# Patient Record
Sex: FEMALE | HISPANIC OR LATINO | URBAN - METROPOLITAN AREA
[De-identification: names, ages, dates, MRNs, and addresses within clinical notes are randomized per-mention and may not be internally consistent; named-entity substitution may affect disease eponyms.]

---

## 2022-08-03 ENCOUNTER — OFFICE (OUTPATIENT)
Dept: URBAN - METROPOLITAN AREA CLINIC 94 | Facility: CLINIC | Age: 62
Setting detail: OPHTHALMOLOGY
End: 2022-08-03
Payer: COMMERCIAL

## 2022-08-03 DIAGNOSIS — H35.30: ICD-10-CM

## 2022-08-03 DIAGNOSIS — H04.123: ICD-10-CM

## 2022-08-03 PROBLEM — H25.13 CATARACT SENILE NUCLEAR SCLEROSIS; BOTH EYES: Status: ACTIVE | Noted: 2022-08-03

## 2022-08-03 PROCEDURE — 92250 FUNDUS PHOTOGRAPHY W/I&R: CPT | Performed by: OPHTHALMOLOGY

## 2022-08-03 PROCEDURE — 92002 INTRM OPH EXAM NEW PATIENT: CPT | Performed by: OPHTHALMOLOGY

## 2022-08-03 ASSESSMENT — REFRACTION_CURRENTRX
OD_ADD: +1.75
OD_AXIS: 087
OS_SPHERE: +2.75
OS_OVR_VA: 20/
OD_OVR_VA: 20/
OS_CYLINDER: -0.50
OS_AXIS: 107
OS_ADD: +1.75
OD_VPRISM_DIRECTION: PROGS
OD_CYLINDER: -0.25
OS_VPRISM_DIRECTION: PROGS
OD_SPHERE: +2.50

## 2022-08-03 ASSESSMENT — REFRACTION_MANIFEST
OS_VA1: 20/20
OD_AXIS: 080
OD_SPHERE: +2.25
OD_ADD: +1.25
OS_AXIS: 090
OD_CYLINDER: -0.75
OS_SPHERE: +2.50
OS_CYLINDER: -0.75
OS_ADD: +1.00
OD_VA1: 20/20

## 2022-08-03 ASSESSMENT — SUPERFICIAL PUNCTATE KERATITIS (SPK)
OD_SPK: 1+
OS_SPK: 1+

## 2022-08-03 ASSESSMENT — AXIALLENGTH_DERIVED
OS_AL: 22.9516
OS_AL: 23.1844
OD_AL: 23.2788
OD_AL: 22.9516

## 2022-08-03 ASSESSMENT — REFRACTION_AUTOREFRACTION
OD_AXIS: 086
OS_AXIS: 095
OD_SPHERE: +3.00
OD_CYLINDER: -0.50
OS_SPHERE: +3.00
OS_CYLINDER: -0.50

## 2022-08-03 ASSESSMENT — KERATOMETRY
OS_K2POWER_DIOPTERS: 42.50
OD_K2POWER_DIOPTERS: 42.50
OD_AXISANGLE_DEGREES: 180
OS_K1POWER_DIOPTERS: 42.25
OS_AXISANGLE_DEGREES: 034
OD_K1POWER_DIOPTERS: 42.25

## 2022-08-03 ASSESSMENT — LID EXAM ASSESSMENTS
OD_BLEPHARITIS: RLL 1+
OS_BLEPHARITIS: LLL 1+
OS_TRICHIASIS: LLL T

## 2022-08-03 ASSESSMENT — CORNEAL PTERYGIUM: OD_PTERYGIUM: NASAL 1MM

## 2022-08-03 ASSESSMENT — SPHEQUIV_DERIVED
OS_SPHEQUIV: 2.75
OD_SPHEQUIV: 2.75
OD_SPHEQUIV: 1.875
OS_SPHEQUIV: 2.125

## 2022-08-03 ASSESSMENT — VISUAL ACUITY
OD_BCVA: 20/20-
OS_BCVA: 20/25+

## 2022-08-03 ASSESSMENT — TONOMETRY
OS_IOP_MMHG: 16
OD_IOP_MMHG: 18

## 2022-08-03 ASSESSMENT — CONFRONTATIONAL VISUAL FIELD TEST (CVF)
OD_FINDINGS: FULL
OS_FINDINGS: FULL

## 2023-02-20 ENCOUNTER — APPOINTMENT (OUTPATIENT)
Dept: GASTROENTEROLOGY | Facility: CLINIC | Age: 63
End: 2023-02-20
Payer: MEDICAID

## 2023-02-20 VITALS
OXYGEN SATURATION: 97 % | BODY MASS INDEX: 25.6 KG/M2 | TEMPERATURE: 97.6 F | WEIGHT: 127 LBS | SYSTOLIC BLOOD PRESSURE: 130 MMHG | DIASTOLIC BLOOD PRESSURE: 78 MMHG | HEART RATE: 64 BPM | RESPIRATION RATE: 16 BRPM | HEIGHT: 59 IN

## 2023-02-20 PROCEDURE — 99203 OFFICE O/P NEW LOW 30 MIN: CPT

## 2023-02-20 RX ORDER — PANTOPRAZOLE 40 MG/1
40 TABLET, DELAYED RELEASE ORAL TWICE DAILY
Qty: 120 | Refills: 1 | Status: ACTIVE | COMMUNITY
Start: 2023-02-20 | End: 1900-01-01

## 2023-02-20 NOTE — ASSESSMENT
[FreeTextEntry1] : 62-year-old lady with hypertension, hyperlipidemia, surgical history significant for gallbladder resection and a  here for GERD symptoms.  The patient reports that she had an EGD more than 20 years ago which to her recollection was normal.  She has been having symptoms for a few months she does not take any NSAIDs she does not see any blood in the stool I have no recent blood work.  She has been gaining weight more recently.  She has not tried pantoprazole intermittently but is afraid to take it more regularly.  She does not smoke or drink alcohol.  There is no family history of any GI cancer.

## 2023-02-20 NOTE — HISTORY OF PRESENT ILLNESS
[FreeTextEntry1] : 62-year-old lady h/o HTN, HL, s/p lap nela, C section, tubal ligation, here for GERD sx. \par \par 1-2 mo ago\par occ odynophagia\par no spf food trigger\par can happen with water as well\par no prior\par ? EGD many years ago - ?20 y ago\par [place] the [place]\par \par increased weight\par no blood in the stool, no dark stools\par \par colon 5 y ago - was told she's due\par no FHx colon cancer\par + tics last time\par Dr Ibrahim [id number]\par avoids NSAIDs Tylenol for HA only\par \par PPI rx \par

## 2023-03-16 ENCOUNTER — EMERGENCY (EMERGENCY)
Facility: HOSPITAL | Age: 63
LOS: 1 days | Discharge: HOME | End: 2023-03-16
Attending: EMERGENCY MEDICINE
Payer: COMMERCIAL

## 2023-03-16 VITALS
OXYGEN SATURATION: 98 % | WEIGHT: 122.14 LBS | DIASTOLIC BLOOD PRESSURE: 62 MMHG | TEMPERATURE: 98 F | SYSTOLIC BLOOD PRESSURE: 125 MMHG | HEART RATE: 63 BPM | RESPIRATION RATE: 16 BRPM

## 2023-03-16 LAB
ALBUMIN SERPL ELPH-MCNC: 4.3 G/DL — SIGNIFICANT CHANGE UP (ref 3.3–5.2)
ALP SERPL-CCNC: 111 U/L — SIGNIFICANT CHANGE UP (ref 40–120)
ALT FLD-CCNC: 186 U/L — HIGH
ANION GAP SERPL CALC-SCNC: 14 MMOL/L — SIGNIFICANT CHANGE UP (ref 5–17)
AST SERPL-CCNC: 129 U/L — HIGH
BASOPHILS # BLD AUTO: 0.03 K/UL — SIGNIFICANT CHANGE UP (ref 0–0.2)
BASOPHILS NFR BLD AUTO: 0.4 % — SIGNIFICANT CHANGE UP (ref 0–2)
BILIRUB SERPL-MCNC: 0.5 MG/DL — SIGNIFICANT CHANGE UP (ref 0.4–2)
BUN SERPL-MCNC: 22.8 MG/DL — HIGH (ref 8–20)
CALCIUM SERPL-MCNC: 8.7 MG/DL — SIGNIFICANT CHANGE UP (ref 8.4–10.5)
CHLORIDE SERPL-SCNC: 105 MMOL/L — SIGNIFICANT CHANGE UP (ref 96–108)
CO2 SERPL-SCNC: 18 MMOL/L — LOW (ref 22–29)
CREAT SERPL-MCNC: 0.82 MG/DL — SIGNIFICANT CHANGE UP (ref 0.5–1.3)
EGFR: 80 ML/MIN/1.73M2 — SIGNIFICANT CHANGE UP
EOSINOPHIL # BLD AUTO: 0.06 K/UL — SIGNIFICANT CHANGE UP (ref 0–0.5)
EOSINOPHIL NFR BLD AUTO: 0.8 % — SIGNIFICANT CHANGE UP (ref 0–6)
GLUCOSE SERPL-MCNC: 95 MG/DL — SIGNIFICANT CHANGE UP (ref 70–99)
HCT VFR BLD CALC: 39.7 % — SIGNIFICANT CHANGE UP (ref 34.5–45)
HGB BLD-MCNC: 13.2 G/DL — SIGNIFICANT CHANGE UP (ref 11.5–15.5)
IMM GRANULOCYTES NFR BLD AUTO: 0.3 % — SIGNIFICANT CHANGE UP (ref 0–0.9)
LYMPHOCYTES # BLD AUTO: 2.1 K/UL — SIGNIFICANT CHANGE UP (ref 1–3.3)
LYMPHOCYTES # BLD AUTO: 29 % — SIGNIFICANT CHANGE UP (ref 13–44)
MAGNESIUM SERPL-MCNC: 2.3 MG/DL — SIGNIFICANT CHANGE UP (ref 1.8–2.6)
MCHC RBC-ENTMCNC: 29.9 PG — SIGNIFICANT CHANGE UP (ref 27–34)
MCHC RBC-ENTMCNC: 33.2 GM/DL — SIGNIFICANT CHANGE UP (ref 32–36)
MCV RBC AUTO: 89.8 FL — SIGNIFICANT CHANGE UP (ref 80–100)
MONOCYTES # BLD AUTO: 0.47 K/UL — SIGNIFICANT CHANGE UP (ref 0–0.9)
MONOCYTES NFR BLD AUTO: 6.5 % — SIGNIFICANT CHANGE UP (ref 2–14)
NEUTROPHILS # BLD AUTO: 4.57 K/UL — SIGNIFICANT CHANGE UP (ref 1.8–7.4)
NEUTROPHILS NFR BLD AUTO: 63 % — SIGNIFICANT CHANGE UP (ref 43–77)
PLATELET # BLD AUTO: 235 K/UL — SIGNIFICANT CHANGE UP (ref 150–400)
POTASSIUM SERPL-MCNC: 4.2 MMOL/L — SIGNIFICANT CHANGE UP (ref 3.5–5.3)
POTASSIUM SERPL-SCNC: 4.2 MMOL/L — SIGNIFICANT CHANGE UP (ref 3.5–5.3)
PROT SERPL-MCNC: 7.2 G/DL — SIGNIFICANT CHANGE UP (ref 6.6–8.7)
RBC # BLD: 4.42 M/UL — SIGNIFICANT CHANGE UP (ref 3.8–5.2)
RBC # FLD: 13.2 % — SIGNIFICANT CHANGE UP (ref 10.3–14.5)
SODIUM SERPL-SCNC: 137 MMOL/L — SIGNIFICANT CHANGE UP (ref 135–145)
WBC # BLD: 7.25 K/UL — SIGNIFICANT CHANGE UP (ref 3.8–10.5)
WBC # FLD AUTO: 7.25 K/UL — SIGNIFICANT CHANGE UP (ref 3.8–10.5)

## 2023-03-16 PROCEDURE — 36415 COLL VENOUS BLD VENIPUNCTURE: CPT

## 2023-03-16 PROCEDURE — 96375 TX/PRO/DX INJ NEW DRUG ADDON: CPT

## 2023-03-16 PROCEDURE — 80053 COMPREHEN METABOLIC PANEL: CPT

## 2023-03-16 PROCEDURE — 96361 HYDRATE IV INFUSION ADD-ON: CPT

## 2023-03-16 PROCEDURE — 99284 EMERGENCY DEPT VISIT MOD MDM: CPT | Mod: 25

## 2023-03-16 PROCEDURE — 99284 EMERGENCY DEPT VISIT MOD MDM: CPT

## 2023-03-16 PROCEDURE — 85025 COMPLETE CBC W/AUTO DIFF WBC: CPT

## 2023-03-16 PROCEDURE — 96374 THER/PROPH/DIAG INJ IV PUSH: CPT

## 2023-03-16 PROCEDURE — 83735 ASSAY OF MAGNESIUM: CPT

## 2023-03-16 PROCEDURE — T1013: CPT

## 2023-03-16 RX ORDER — SODIUM CHLORIDE 9 MG/ML
1000 INJECTION INTRAMUSCULAR; INTRAVENOUS; SUBCUTANEOUS ONCE
Refills: 0 | Status: COMPLETED | OUTPATIENT
Start: 2023-03-16 | End: 2023-03-16

## 2023-03-16 RX ORDER — ONDANSETRON 8 MG/1
4 TABLET, FILM COATED ORAL ONCE
Refills: 0 | Status: COMPLETED | OUTPATIENT
Start: 2023-03-16 | End: 2023-03-16

## 2023-03-16 RX ORDER — FAMOTIDINE 10 MG/ML
20 INJECTION INTRAVENOUS ONCE
Refills: 0 | Status: COMPLETED | OUTPATIENT
Start: 2023-03-16 | End: 2023-03-16

## 2023-03-16 RX ADMIN — SODIUM CHLORIDE 1000 MILLILITER(S): 9 INJECTION INTRAMUSCULAR; INTRAVENOUS; SUBCUTANEOUS at 16:20

## 2023-03-16 RX ADMIN — Medication 30 MILLILITER(S): at 16:19

## 2023-03-16 RX ADMIN — FAMOTIDINE 20 MILLIGRAM(S): 10 INJECTION INTRAVENOUS at 16:20

## 2023-03-16 RX ADMIN — ONDANSETRON 4 MILLIGRAM(S): 8 TABLET, FILM COATED ORAL at 16:20

## 2023-03-16 NOTE — ED STATDOCS - PATIENT PORTAL LINK FT
You can access the FollowMyHealth Patient Portal offered by [facility] by registering at the following website: [email or web address]. By joining [facility]’s FollowMyHealth portal, you will also be able to view your health information using other applications (apps) compatible with our system.

## 2023-03-16 NOTE — ED STATDOCS - NS ED ATTENDING STATEMENT MOD
This was a shared visit with the MILI. I reviewed and verified the documentation and independently performed the documented: 2019 19:24

## 2023-03-16 NOTE — ED STATDOCS - CLINICAL SUMMARY MEDICAL DECISION MAKING FREE TEXT BOX
61 y/o female with 3-4 days of N/V/D, benign abdominal exam, vital grossly WNL with + sick contacts, other well appearing, suspect viral gastroenteritis, will treat symptoms, check labs, reassess.

## 2023-03-16 NOTE — ED STATDOCS - OBJECTIVE STATEMENT
63 y/o female presents to the ED with 3-4 days of N/V/D, reports 10 episode diarrhea daily, granddaughter with similar on friday, no fevers, no other significant complaints.

## 2023-03-16 NOTE — ED STATDOCS - NS ED ROS FT
ROS: No fever/chills. No eye pain/changes in vision, No ear pain/sore throat/dysphagia, No chest pain/palpitations. No SOB/cough/. no black/bloody bm. No dysuria/frequency/discharge, No headache. No Dizziness.    No rashes or breaks in skin. No numbness/tingling/weakness.

## 2023-03-16 NOTE — ED STATDOCS - ATTENDING APP SHARED VISIT CONTRIBUTION OF CARE
Antwon: I performed a face to face bedside interview with patient regarding history of present illness, review of symptoms and past medical history. I completed an independent physical exam and ordered tests/medications as needed.  I have discussed patient's plan of care with advanced care provider. The advanced care provider assisted in  executing the discussed plan. I was available for any questions or issues that may have arose during the execution of the plan of care.

## 2023-03-16 NOTE — ED STATDOCS - PHYSICAL EXAMINATION
Head: atraumatic, normocephalic  Face: atraumatic, no crepitus no orbital/maxillary/mandibular ttp,   throat: uvula midline no exudates, slightly dry mucous membranes   eyes: perrla eomi  heart: rrr s1s2  lungs: ctab  abd: soft, nt nd +bs no rebound/guarding no cva ttp  skin: warm  LE: no swelling, no calf ttp  back: no midline cervical/thoracic/lumbar ttp

## 2023-03-16 NOTE — ED STATDOCS - PROGRESS NOTE DETAILS
Labs + elevated transaminase and pt was made aware of her Lab findings. Pt D/C in stable condition and will continue with BRAT Diet . Pt moved form intake Room. Pt seen and evaluated by intake Physician. HPI, Physical examination performed by intake Physician . Note reviewed and followup examination performed by me consistent with initial assessment. Agrees with intake Physician plan and tests.

## 2023-03-21 ENCOUNTER — APPOINTMENT (OUTPATIENT)
Dept: GASTROENTEROLOGY | Facility: CLINIC | Age: 63
End: 2023-03-21

## 2023-05-31 ENCOUNTER — APPOINTMENT (OUTPATIENT)
Dept: GASTROENTEROLOGY | Facility: CLINIC | Age: 63
End: 2023-05-31
Payer: MEDICAID

## 2023-05-31 VITALS
TEMPERATURE: 97.9 F | DIASTOLIC BLOOD PRESSURE: 78 MMHG | OXYGEN SATURATION: 97 % | SYSTOLIC BLOOD PRESSURE: 110 MMHG | RESPIRATION RATE: 14 BRPM | BODY MASS INDEX: 26.21 KG/M2 | WEIGHT: 130 LBS | HEART RATE: 64 BPM | HEIGHT: 59 IN

## 2023-05-31 DIAGNOSIS — Z00.00 ENCOUNTER FOR GENERAL ADULT MEDICAL EXAMINATION W/OUT ABNORMAL FINDINGS: ICD-10-CM

## 2023-05-31 PROCEDURE — 99214 OFFICE O/P EST MOD 30 MIN: CPT

## 2023-05-31 RX ORDER — SODIUM SULFATE, MAGNESIUM SULFATE, AND POTASSIUM CHLORIDE 17.75; 2.7; 2.25 G/1; G/1; G/1
1479-225-188 TABLET ORAL
Qty: 24 | Refills: 0 | Status: ACTIVE | COMMUNITY
Start: 2023-05-31 | End: 1900-01-01

## 2023-05-31 NOTE — HISTORY OF PRESENT ILLNESS
[FreeTextEntry1] : 62-year-old lady here in follow-up.  Past medical history significant for GERD only\par Last saw me in February 2023 plan was as below:\par \par 1. GERD diet; we discussed avoiding spicy food, carbonated beverages, caffeinated beverages, citric products, tomatoes.\par 2. PPI twice daily trial for a month\par 3. Return here in a month; if no substantive improvement she will need an endoscopy\par 4. Patient had a colonoscopy approximately 5 years ago and her primary care doctor thinks she is due again. To the best of her recollection she was told she had diverticuli and she does not recall the gastroenterologist giving her a follow-up interval. I asked her to sign a release of records form so we can request this colonoscopy and she might have a copy at home which she will try to send to me as well. \par \par Patient is feeling better but not 100% with the acid blocker.  Regarding the colonoscopy, she and our office have been unable to locate previous colonoscopy reports.

## 2023-05-31 NOTE — ASSESSMENT
[FreeTextEntry1] : 62-year-old lady here in follow-up.  Past medical history significant for GERD only.  I am not clear that she is up-to-date with colon cancer screening -the previous report could not be obtained by either the patient or the office from her last gastroenterologist

## 2023-08-06 ENCOUNTER — TRANSCRIPTION ENCOUNTER (OUTPATIENT)
Age: 63
End: 2023-08-06

## 2023-08-07 ENCOUNTER — APPOINTMENT (OUTPATIENT)
Dept: GASTROENTEROLOGY | Facility: GI CENTER | Age: 63
End: 2023-08-07
Payer: MEDICAID

## 2023-08-07 ENCOUNTER — OUTPATIENT (OUTPATIENT)
Dept: OUTPATIENT SERVICES | Facility: HOSPITAL | Age: 63
LOS: 1 days | End: 2023-08-07
Payer: COMMERCIAL

## 2023-08-07 ENCOUNTER — RESULT REVIEW (OUTPATIENT)
Age: 63
End: 2023-08-07

## 2023-08-07 DIAGNOSIS — K21.9 GASTRO-ESOPHAGEAL REFLUX DISEASE W/OUT ESOPHAGITIS: ICD-10-CM

## 2023-08-07 DIAGNOSIS — K21.9 GASTRO-ESOPHAGEAL REFLUX DISEASE WITHOUT ESOPHAGITIS: ICD-10-CM

## 2023-08-07 DIAGNOSIS — Z12.11 ENCOUNTER FOR SCREENING FOR MALIGNANT NEOPLASM OF COLON: ICD-10-CM

## 2023-08-07 PROCEDURE — 88342 IMHCHEM/IMCYTCHM 1ST ANTB: CPT | Mod: 26

## 2023-08-07 PROCEDURE — 45380 COLONOSCOPY AND BIOPSY: CPT | Mod: 33

## 2023-08-07 PROCEDURE — 43239 EGD BIOPSY SINGLE/MULTIPLE: CPT

## 2023-08-07 PROCEDURE — 88305 TISSUE EXAM BY PATHOLOGIST: CPT | Mod: 26

## 2023-08-07 PROCEDURE — 45380 COLONOSCOPY AND BIOPSY: CPT | Mod: PT

## 2023-08-07 PROCEDURE — 43239 EGD BIOPSY SINGLE/MULTIPLE: CPT | Mod: 59

## 2023-08-07 PROCEDURE — 88305 TISSUE EXAM BY PATHOLOGIST: CPT

## 2023-08-07 PROCEDURE — 88342 IMHCHEM/IMCYTCHM 1ST ANTB: CPT

## 2023-08-07 NOTE — ASSESSMENT
[FreeTextEntry1] : 63 F GERD here for colon cancer screening.   The procedure(s) was (were) discussed in detail with the patient, including indications, alternatives and limitations.  The risks and benefits were reviewed.  If applicable, the prep directions were reviewed as well, else the patient was told to fast on the day of the procedure.

## 2023-08-07 NOTE — HISTORY OF PRESENT ILLNESS
[FreeTextEntry1] : 63 F GERD here for colon cancer screening.  Last colon 5 y ago tics rest of details lacking

## 2023-08-16 ENCOUNTER — NON-APPOINTMENT (OUTPATIENT)
Age: 63
End: 2023-08-16

## 2023-08-16 LAB
SURGICAL PATHOLOGY STUDY: SIGNIFICANT CHANGE UP

## 2023-09-22 ENCOUNTER — OFFICE (OUTPATIENT)
Dept: URBAN - METROPOLITAN AREA CLINIC 94 | Facility: CLINIC | Age: 63
Setting detail: OPHTHALMOLOGY
End: 2023-09-22
Payer: COMMERCIAL

## 2023-09-22 DIAGNOSIS — H04.123: ICD-10-CM

## 2023-09-22 DIAGNOSIS — H35.30: ICD-10-CM

## 2023-09-22 DIAGNOSIS — H25.13: ICD-10-CM

## 2023-09-22 PROBLEM — H25.043 POSTERIOR SUBCAPSULAR CATARACT 366.14; BOTH EYES: Status: ACTIVE | Noted: 2023-09-22

## 2023-09-22 PROBLEM — H16.223 DRY EYE SYNDROME K SICCA; BOTH EYES: Status: ACTIVE | Noted: 2023-09-22

## 2023-09-22 PROCEDURE — 92250 FUNDUS PHOTOGRAPHY W/I&R: CPT | Performed by: OPHTHALMOLOGY

## 2023-09-22 PROCEDURE — 92014 COMPRE OPH EXAM EST PT 1/>: CPT | Performed by: OPHTHALMOLOGY

## 2023-09-22 ASSESSMENT — REFRACTION_MANIFEST
OS_ADD: +1.00
OD_AXIS: 080
OS_CYLINDER: -0.75
OD_SPHERE: +2.25
OS_AXIS: 090
OD_CYLINDER: -0.75
OS_SPHERE: +2.50
OD_VA1: 20/20
OS_VA1: 20/20
OD_ADD: +1.25

## 2023-09-22 ASSESSMENT — REFRACTION_CURRENTRX
OS_OVR_VA: 20/
OD_OVR_VA: 20/
OS_CYLINDER: -0.50
OS_AXIS: 110
OS_AXIS: 107
OD_OVR_VA: 20/
OD_VPRISM_DIRECTION: PROGS
OS_SPHERE: +2.75
OS_CYLINDER: -0.50
OS_SPHERE: +2.75
OS_ADD: +2.00
OS_ADD: +1.75
OS_OVR_VA: 20/
OD_ADD: +2.00
OD_ADD: +1.75
OD_CYLINDER: -0.25
OS_VPRISM_DIRECTION: PROGS
OS_VPRISM_DIRECTION: PROGS
OD_AXIS: 087
OD_VPRISM_DIRECTION: PROGS
OD_CYLINDER: -0.25
OD_AXIS: 037
OD_SPHERE: +2.50
OD_SPHERE: +2.75

## 2023-09-22 ASSESSMENT — LID EXAM ASSESSMENTS
OD_BLEPHARITIS: RLL 1+
OS_TRICHIASIS: LLL T
OS_BLEPHARITIS: LLL 1+

## 2023-09-22 ASSESSMENT — CONFRONTATIONAL VISUAL FIELD TEST (CVF)
OS_FINDINGS: FULL
OD_FINDINGS: FULL

## 2023-09-22 ASSESSMENT — VISUAL ACUITY
OD_BCVA: 20/30
OS_BCVA: 20/25-1

## 2023-09-22 ASSESSMENT — SPHEQUIV_DERIVED
OS_SPHEQUIV: 2.125
OD_SPHEQUIV: 2.875
OS_SPHEQUIV: 2.875
OD_SPHEQUIV: 1.875

## 2023-09-22 ASSESSMENT — REFRACTION_AUTOREFRACTION
OD_AXIS: 092
OS_CYLINDER: -0.75
OD_SPHERE: +3.25
OD_CYLINDER: -0.75
OS_SPHERE: +3.25
OS_AXIS: 089

## 2023-09-22 ASSESSMENT — KERATOMETRY
OD_AXISANGLE_DEGREES: 180
OD_K1POWER_DIOPTERS: 42.25
METHOD_AUTO_MANUAL: AUTO
OS_K2POWER_DIOPTERS: 42.50
OS_AXISANGLE_DEGREES: 034
OD_K2POWER_DIOPTERS: 42.50
OS_K1POWER_DIOPTERS: 42.25

## 2023-09-22 ASSESSMENT — TONOMETRY
OS_IOP_MMHG: 18
OD_IOP_MMHG: 14

## 2023-09-22 ASSESSMENT — AXIALLENGTH_DERIVED
OD_AL: 23.2788
OS_AL: 22.9056
OS_AL: 23.1844
OD_AL: 22.9056

## 2023-09-22 ASSESSMENT — SUPERFICIAL PUNCTATE KERATITIS (SPK)
OS_SPK: 1+
OD_SPK: 1+

## 2023-09-22 ASSESSMENT — CORNEAL PTERYGIUM: OD_PTERYGIUM: NASAL 1MM

## 2023-12-15 ENCOUNTER — OFFICE (OUTPATIENT)
Dept: URBAN - METROPOLITAN AREA CLINIC 94 | Facility: CLINIC | Age: 63
Setting detail: OPHTHALMOLOGY
End: 2023-12-15
Payer: COMMERCIAL

## 2023-12-15 DIAGNOSIS — H35.033: ICD-10-CM

## 2023-12-15 DIAGNOSIS — H25.043: ICD-10-CM

## 2023-12-15 DIAGNOSIS — H25.11: ICD-10-CM

## 2023-12-15 DIAGNOSIS — H25.13: ICD-10-CM

## 2023-12-15 DIAGNOSIS — H04.123: ICD-10-CM

## 2023-12-15 PROBLEM — H25.12 CATARACT SENILE NUCLEAR SCLEROSIS; RIGHT EYE, LEFT EYE, BOTH EYES: Status: ACTIVE | Noted: 2023-12-15

## 2023-12-15 PROCEDURE — 92250 FUNDUS PHOTOGRAPHY W/I&R: CPT | Performed by: OPHTHALMOLOGY

## 2023-12-15 PROCEDURE — 92136 OPHTHALMIC BIOMETRY: CPT | Mod: 26,RT | Performed by: OPHTHALMOLOGY

## 2023-12-15 PROCEDURE — 92136 OPHTHALMIC BIOMETRY: CPT | Mod: TC | Performed by: OPHTHALMOLOGY

## 2023-12-15 PROCEDURE — 99214 OFFICE O/P EST MOD 30 MIN: CPT | Performed by: OPHTHALMOLOGY

## 2023-12-15 ASSESSMENT — REFRACTION_MANIFEST
OD_ADD: +1.25
OD_CYLINDER: -0.75
OD_SPHERE: +3.00
OS_ADD: +1.00
OD_AXIS: 086
OS_VA1: 20/20
OS_SPHERE: +3.25
OS_AXIS: 090
OS_SPHERE: +2.50
OD_SPHERE: +2.25
OS_CYLINDER: -0.75
OS_VA1: 20/20
OD_CYLINDER: -1.00
OD_AXIS: 080
OS_CYLINDER: -0.75
OS_AXIS: 094
OD_VA1: 20/20
OD_VA1: 20/25

## 2023-12-15 ASSESSMENT — CORNEAL PTERYGIUM: OD_PTERYGIUM: NASAL 1MM

## 2023-12-15 ASSESSMENT — LID EXAM ASSESSMENTS
OS_BLEPHARITIS: LLL 1+
OD_BLEPHARITIS: RLL 1+
OS_TRICHIASIS: LLL T

## 2023-12-15 ASSESSMENT — REFRACTION_AUTOREFRACTION
OD_AXIS: 086
OD_CYLINDER: -1.00
OS_CYLINDER: -0.75
OD_SPHERE: +3.00
OS_SPHERE: +3.25
OS_AXIS: 094

## 2023-12-15 ASSESSMENT — REFRACTION_CURRENTRX
OD_ADD: +2.00
OS_CYLINDER: -0.50
OD_ADD: +1.75
OD_AXIS: 037
OS_OVR_VA: 20/
OS_VPRISM_DIRECTION: PROGS
OS_AXIS: 107
OD_AXIS: 087
OD_VPRISM_DIRECTION: PROGS
OS_OVR_VA: 20/
OD_OVR_VA: 20/
OD_VPRISM_DIRECTION: PROGS
OD_OVR_VA: 20/
OD_CYLINDER: -0.25
OS_SPHERE: +2.75
OS_CYLINDER: -0.50
OD_CYLINDER: -0.25
OS_VPRISM_DIRECTION: PROGS
OS_SPHERE: +2.75
OS_ADD: +1.75
OS_ADD: +2.00
OD_SPHERE: +2.50
OD_SPHERE: +2.75
OS_AXIS: 110

## 2023-12-15 ASSESSMENT — SUPERFICIAL PUNCTATE KERATITIS (SPK)
OD_SPK: 1+
OS_SPK: 1+

## 2023-12-15 ASSESSMENT — SPHEQUIV_DERIVED
OS_SPHEQUIV: 2.875
OD_SPHEQUIV: 2.5
OD_SPHEQUIV: 2.5
OD_SPHEQUIV: 1.875
OS_SPHEQUIV: 2.875
OS_SPHEQUIV: 2.125

## 2023-12-15 ASSESSMENT — CONFRONTATIONAL VISUAL FIELD TEST (CVF)
OS_FINDINGS: FULL
OD_FINDINGS: FULL

## 2024-03-01 ENCOUNTER — ASC (OUTPATIENT)
Dept: URBAN - METROPOLITAN AREA SURGERY 8 | Facility: SURGERY | Age: 64
Setting detail: OPHTHALMOLOGY
End: 2024-03-01
Payer: COMMERCIAL

## 2024-03-01 DIAGNOSIS — H25.11: ICD-10-CM

## 2024-03-01 DIAGNOSIS — H52.211: ICD-10-CM

## 2024-03-01 PROCEDURE — FEMTO FEMTOSECOND LASER: Mod: GY | Performed by: OPHTHALMOLOGY

## 2024-03-01 PROCEDURE — 66984 XCAPSL CTRC RMVL W/O ECP: CPT | Mod: RT | Performed by: OPHTHALMOLOGY

## 2024-03-02 ENCOUNTER — OFFICE (OUTPATIENT)
Dept: URBAN - METROPOLITAN AREA CLINIC 94 | Facility: CLINIC | Age: 64
Setting detail: OPHTHALMOLOGY
End: 2024-03-02
Payer: COMMERCIAL

## 2024-03-02 DIAGNOSIS — Z96.1: ICD-10-CM

## 2024-03-02 PROCEDURE — 99024 POSTOP FOLLOW-UP VISIT: CPT | Performed by: REGISTERED NURSE

## 2024-03-02 ASSESSMENT — REFRACTION_CURRENTRX
OD_SPHERE: +2.75
OS_CYLINDER: -0.50
OD_VPRISM_DIRECTION: PROGS
OD_ADD: +1.75
OS_ADD: +2.00
OD_VPRISM_DIRECTION: PROGS
OS_AXIS: 107
OD_AXIS: 037
OD_CYLINDER: -0.25
OS_VPRISM_DIRECTION: PROGS
OS_SPHERE: +2.75
OD_AXIS: 087
OS_SPHERE: +2.75
OD_ADD: +2.00
OD_SPHERE: +2.50
OS_AXIS: 110
OS_ADD: +1.75
OS_OVR_VA: 20/
OS_CYLINDER: -0.50
OD_OVR_VA: 20/
OD_OVR_VA: 20/
OS_OVR_VA: 20/
OS_VPRISM_DIRECTION: PROGS
OD_CYLINDER: -0.25

## 2024-03-02 ASSESSMENT — REFRACTION_MANIFEST
OS_CYLINDER: -0.75
OS_CYLINDER: -0.75
OD_SPHERE: +3.00
OD_CYLINDER: -0.75
OS_AXIS: 090
OS_VA1: 20/20
OS_ADD: +1.00
OD_VA1: 20/25
OS_SPHERE: +3.25
OD_AXIS: 080
OD_CYLINDER: -1.00
OD_SPHERE: +2.25
OS_SPHERE: +2.50
OD_VA1: 20/20
OD_ADD: +1.25
OD_AXIS: 086
OS_AXIS: 094
OS_VA1: 20/20

## 2024-03-02 ASSESSMENT — LID EXAM ASSESSMENTS
OD_BLEPHARITIS: RLL 1+
OS_TRICHIASIS: LLL T
OS_BLEPHARITIS: LLL 1+

## 2024-03-02 ASSESSMENT — SPHEQUIV_DERIVED
OD_SPHEQUIV: 1.875
OS_SPHEQUIV: 2.875
OD_SPHEQUIV: 2.5
OS_SPHEQUIV: 2.125

## 2024-03-08 ENCOUNTER — RX ONLY (RX ONLY)
Age: 64
End: 2024-03-08

## 2024-03-08 ENCOUNTER — OFFICE (OUTPATIENT)
Dept: URBAN - METROPOLITAN AREA CLINIC 94 | Facility: CLINIC | Age: 64
Setting detail: OPHTHALMOLOGY
End: 2024-03-08
Payer: COMMERCIAL

## 2024-03-08 DIAGNOSIS — H43.391: ICD-10-CM

## 2024-03-08 DIAGNOSIS — H25.12: ICD-10-CM

## 2024-03-08 DIAGNOSIS — Z96.1: ICD-10-CM

## 2024-03-08 PROCEDURE — 99024 POSTOP FOLLOW-UP VISIT: CPT | Performed by: PHYSICIAN ASSISTANT

## 2024-03-08 ASSESSMENT — LID EXAM ASSESSMENTS
OD_BLEPHARITIS: RLL 1+
OS_TRICHIASIS: LLL T
OS_BLEPHARITIS: LLL 1+

## 2024-03-08 ASSESSMENT — REFRACTION_CURRENTRX
OS_VPRISM_DIRECTION: PROGS
OS_OVR_VA: 20/
OS_CYLINDER: -0.50
OD_AXIS: 037
OD_OVR_VA: 20/
OD_SPHERE: +2.50
OD_VPRISM_DIRECTION: PROGS
OD_CYLINDER: -0.25
OD_CYLINDER: -0.25
OS_AXIS: 110
OS_SPHERE: +2.75
OD_ADD: +1.75
OS_AXIS: 107
OS_SPHERE: +2.75
OD_OVR_VA: 20/
OS_VPRISM_DIRECTION: PROGS
OD_ADD: +2.00
OS_ADD: +1.75
OD_AXIS: 087
OS_CYLINDER: -0.50
OD_SPHERE: +2.75
OD_VPRISM_DIRECTION: PROGS
OS_ADD: +2.00
OS_OVR_VA: 20/

## 2024-03-08 ASSESSMENT — REFRACTION_MANIFEST
OD_AXIS: 086
OS_SPHERE: +3.25
OD_VA1: 20/25
OS_VA1: 20/20
OS_CYLINDER: -0.75
OD_SPHERE: +2.25
OS_CYLINDER: -0.75
OS_VA1: 20/20
OS_SPHERE: +2.50
OD_CYLINDER: -0.75
OD_VA1: 20/20
OD_CYLINDER: -1.00
OD_SPHERE: +3.00
OS_AXIS: 094
OD_AXIS: 080
OS_AXIS: 090
OS_ADD: +1.00
OD_ADD: +1.25

## 2024-03-08 ASSESSMENT — SPHEQUIV_DERIVED
OD_SPHEQUIV: 1.875
OS_SPHEQUIV: 2.875
OD_SPHEQUIV: 2.5
OS_SPHEQUIV: 2.125

## 2024-03-15 ENCOUNTER — ASC (OUTPATIENT)
Dept: URBAN - METROPOLITAN AREA SURGERY 8 | Facility: SURGERY | Age: 64
Setting detail: OPHTHALMOLOGY
End: 2024-03-15
Payer: COMMERCIAL

## 2024-03-15 DIAGNOSIS — H25.12: ICD-10-CM

## 2024-03-15 DIAGNOSIS — H52.212: ICD-10-CM

## 2024-03-15 PROCEDURE — 66984 XCAPSL CTRC RMVL W/O ECP: CPT | Mod: 79,LT | Performed by: OPHTHALMOLOGY

## 2024-03-15 PROCEDURE — FEMTO FEMTOSECOND LASER: Mod: GY | Performed by: OPHTHALMOLOGY

## 2024-03-16 ENCOUNTER — OFFICE (OUTPATIENT)
Dept: URBAN - METROPOLITAN AREA CLINIC 94 | Facility: CLINIC | Age: 64
Setting detail: OPHTHALMOLOGY
End: 2024-03-16
Payer: COMMERCIAL

## 2024-03-16 ENCOUNTER — RX ONLY (RX ONLY)
Age: 64
End: 2024-03-16

## 2024-03-16 DIAGNOSIS — Z96.1: ICD-10-CM

## 2024-03-16 PROCEDURE — 99024 POSTOP FOLLOW-UP VISIT: CPT | Performed by: OPTOMETRIST

## 2024-03-16 ASSESSMENT — REFRACTION_CURRENTRX
OS_SPHERE: +2.75
OD_OVR_VA: 20/
OS_AXIS: 110
OS_VPRISM_DIRECTION: PROGS
OS_CYLINDER: -0.50
OD_VPRISM_DIRECTION: PROGS
OS_OVR_VA: 20/
OS_CYLINDER: -0.50
OD_AXIS: 087
OD_SPHERE: +2.50
OS_VPRISM_DIRECTION: PROGS
OD_CYLINDER: -0.25
OD_SPHERE: +2.75
OS_ADD: +2.00
OD_ADD: +1.75
OD_CYLINDER: -0.25
OS_ADD: +1.75
OD_ADD: +2.00
OD_OVR_VA: 20/
OS_SPHERE: +2.75
OD_AXIS: 037
OS_OVR_VA: 20/
OS_AXIS: 107
OD_VPRISM_DIRECTION: PROGS

## 2024-03-16 ASSESSMENT — REFRACTION_MANIFEST
OD_ADD: +1.25
OD_AXIS: 080
OD_CYLINDER: -1.00
OS_VA1: 20/20
OS_CYLINDER: -0.75
OS_SPHERE: +3.25
OS_SPHERE: +2.50
OS_CYLINDER: -0.75
OD_SPHERE: +2.25
OS_VA1: 20/20
OD_VA1: 20/20
OD_VA1: 20/25
OS_AXIS: 090
OS_AXIS: 094
OS_ADD: +1.00
OD_AXIS: 086
OD_CYLINDER: -0.75
OD_SPHERE: +3.00

## 2024-03-16 ASSESSMENT — LID EXAM ASSESSMENTS
OS_BLEPHARITIS: LLL 1+
OS_TRICHIASIS: LLL T
OD_BLEPHARITIS: RLL 1+

## 2024-03-16 ASSESSMENT — SPHEQUIV_DERIVED
OS_SPHEQUIV: 2.875
OS_SPHEQUIV: 2.125
OD_SPHEQUIV: 2.5
OD_SPHEQUIV: 1.875

## 2024-03-29 ENCOUNTER — OFFICE (OUTPATIENT)
Dept: URBAN - METROPOLITAN AREA CLINIC 94 | Facility: CLINIC | Age: 64
Setting detail: OPHTHALMOLOGY
End: 2024-03-29
Payer: COMMERCIAL

## 2024-03-29 DIAGNOSIS — Z96.1: ICD-10-CM

## 2024-03-29 DIAGNOSIS — H26.493: ICD-10-CM

## 2024-03-29 PROBLEM — H25.042 POSTERIOR SUBCAPSULAR CATARACT 366.14; LEFT EYE: Status: ACTIVE | Noted: 2024-03-02

## 2024-03-29 PROBLEM — H11.041 PTERYGIUM-PERIPHERAL; RIGHT EYE: Status: ACTIVE | Noted: 2024-03-08

## 2024-03-29 PROBLEM — H43.391 VITREOUS FLOATERS; RIGHT EYE: Status: ACTIVE | Noted: 2024-03-08

## 2024-03-29 PROCEDURE — 99024 POSTOP FOLLOW-UP VISIT: CPT | Performed by: PHYSICIAN ASSISTANT

## 2024-03-29 ASSESSMENT — SPHEQUIV_DERIVED
OD_SPHEQUIV: 1.875
OS_SPHEQUIV: 2.125
OD_SPHEQUIV: 2.5
OS_SPHEQUIV: 2.875

## 2024-03-29 ASSESSMENT — REFRACTION_CURRENTRX
OD_ADD: +1.75
OS_OVR_VA: 20/
OD_VPRISM_DIRECTION: PROGS
OD_CYLINDER: -0.25
OD_CYLINDER: -0.25
OD_SPHERE: +2.50
OS_SPHERE: +2.75
OS_SPHERE: +2.75
OD_OVR_VA: 20/
OD_SPHERE: +2.75
OS_OVR_VA: 20/
OS_VPRISM_DIRECTION: PROGS
OS_VPRISM_DIRECTION: PROGS
OD_AXIS: 037
OS_AXIS: 110
OS_ADD: +1.75
OS_ADD: +2.00
OD_ADD: +2.00
OS_AXIS: 107
OS_CYLINDER: -0.50
OS_CYLINDER: -0.50
OD_AXIS: 087
OD_VPRISM_DIRECTION: PROGS
OD_OVR_VA: 20/

## 2024-03-29 ASSESSMENT — REFRACTION_MANIFEST
OD_CYLINDER: -1.00
OD_SPHERE: +3.00
OD_SPHERE: +2.25
OS_ADD: +1.00
OD_VA1: 20/25
OD_ADD: +1.25
OD_AXIS: 086
OS_VA1: 20/20
OS_VA1: 20/20
OS_SPHERE: +3.25
OD_AXIS: 080
OS_AXIS: 094
OD_VA1: 20/20
OS_AXIS: 090
OS_CYLINDER: -0.75
OS_CYLINDER: -0.75
OS_SPHERE: +2.50
OD_CYLINDER: -0.75

## 2024-03-29 ASSESSMENT — LID EXAM ASSESSMENTS
OS_TRICHIASIS: LLL T
OD_BLEPHARITIS: RLL 1+
OS_BLEPHARITIS: LLL 1+

## 2024-04-18 ENCOUNTER — OFFICE (OUTPATIENT)
Dept: URBAN - METROPOLITAN AREA CLINIC 94 | Facility: CLINIC | Age: 64
Setting detail: OPHTHALMOLOGY
End: 2024-04-18
Payer: COMMERCIAL

## 2024-04-18 DIAGNOSIS — Z96.1: ICD-10-CM

## 2024-04-18 PROCEDURE — 99024 POSTOP FOLLOW-UP VISIT: CPT | Performed by: OPTOMETRIST

## 2024-04-18 ASSESSMENT — LID EXAM ASSESSMENTS
OS_BLEPHARITIS: LLL 1+
OS_TRICHIASIS: LLL T
OD_BLEPHARITIS: RLL 1+

## 2024-08-09 ENCOUNTER — OFFICE (OUTPATIENT)
Dept: URBAN - METROPOLITAN AREA CLINIC 94 | Facility: CLINIC | Age: 64
Setting detail: OPHTHALMOLOGY
End: 2024-08-09
Payer: COMMERCIAL

## 2024-08-09 DIAGNOSIS — H26.493: ICD-10-CM

## 2024-08-09 DIAGNOSIS — Z96.1: ICD-10-CM

## 2024-08-09 PROCEDURE — 99214 OFFICE O/P EST MOD 30 MIN: CPT | Performed by: OPHTHALMOLOGY

## 2024-08-09 ASSESSMENT — LID EXAM ASSESSMENTS
OS_BLEPHARITIS: LLL 1+
OS_TRICHIASIS: LLL T
OD_BLEPHARITIS: RLL 1+

## 2024-08-09 ASSESSMENT — CONFRONTATIONAL VISUAL FIELD TEST (CVF)
OS_FINDINGS: FULL
OD_FINDINGS: FULL

## 2024-08-22 ENCOUNTER — APPOINTMENT (OUTPATIENT)
Dept: DERMATOLOGY | Facility: CLINIC | Age: 64
End: 2024-08-22
Payer: MEDICARE

## 2024-08-22 PROCEDURE — 99203 OFFICE O/P NEW LOW 30 MIN: CPT

## 2024-08-28 ENCOUNTER — ASC (OUTPATIENT)
Dept: URBAN - METROPOLITAN AREA SURGERY 8 | Facility: SURGERY | Age: 64
Setting detail: OPHTHALMOLOGY
End: 2024-08-28
Payer: COMMERCIAL

## 2024-08-28 DIAGNOSIS — H26.491: ICD-10-CM

## 2024-08-28 PROCEDURE — 66821 AFTER CATARACT LASER SURGERY: CPT | Mod: RT | Performed by: OPHTHALMOLOGY

## 2024-08-30 PROBLEM — H26.492 POSTERIOR CAPSULE OPACITY; RIGHT EYE, LEFT EYE: Status: ACTIVE | Noted: 2024-08-28

## 2024-08-30 PROBLEM — H26.491 POSTERIOR CAPSULE OPACITY; RIGHT EYE, LEFT EYE: Status: ACTIVE | Noted: 2024-08-28

## 2024-10-04 ENCOUNTER — OFFICE (OUTPATIENT)
Dept: URBAN - METROPOLITAN AREA CLINIC 94 | Facility: CLINIC | Age: 64
Setting detail: OPHTHALMOLOGY
End: 2024-10-04
Payer: COMMERCIAL

## 2024-10-04 DIAGNOSIS — Z96.1: ICD-10-CM

## 2024-10-04 PROCEDURE — 99024 POSTOP FOLLOW-UP VISIT: CPT | Performed by: OPHTHALMOLOGY

## 2024-10-04 ASSESSMENT — REFRACTION_CURRENTRX
OD_SPHERE: +2.50
OD_AXIS: 087
OD_OVR_VA: 20/
OS_OVR_VA: 20/
OS_VPRISM_DIRECTION: PROGS
OD_CYLINDER: -0.25
OD_VPRISM_DIRECTION: PROGS
OS_ADD: +1.75
OD_ADD: +2.00
OD_CYLINDER: -0.25
OD_SPHERE: +2.75
OS_CYLINDER: -0.50
OD_AXIS: 037
OD_OVR_VA: 20/
OS_OVR_VA: 20/
OS_ADD: +2.00
OS_SPHERE: +2.75
OS_VPRISM_DIRECTION: PROGS
OS_AXIS: 110
OS_CYLINDER: -0.50
OD_ADD: +1.75
OD_VPRISM_DIRECTION: PROGS
OS_SPHERE: +2.75
OS_AXIS: 107

## 2024-10-04 ASSESSMENT — REFRACTION_MANIFEST
OD_CYLINDER: -0.75
OD_VA1: 20/20
OS_AXIS: 090
OS_VA1: 20/20
OD_SPHERE: +0.25
OD_VA1: 20/25
OD_AXIS: 095
OD_SPHERE: +2.25
OD_SPHERE: PLANO
OS_VA1: 20/20
OD_VA1: 20/25
OD_AXIS: 086
OS_AXIS: 086
OS_SPHERE: +2.50
OD_SPHERE: +3.00
OS_AXIS: 094
OD_CYLINDER: -0.75
OD_ADD: +1.50
OS_SPHERE: 0.00
OS_ADD: +1.00
OS_SPHERE: +3.25
OD_CYLINDER: -1.00
OS_SPHERE: PLANO
OS_VA1: 20/25
OS_CYLINDER: -0.75
OS_CYLINDER: -0.75
OD_VA1: 20/20
OD_AXIS: 080
OD_ADD: +1.25
OS_VA1: 20/20
OS_ADD: +1.50
OS_CYLINDER: -0.75

## 2024-10-04 ASSESSMENT — REFRACTION_AUTOREFRACTION
OS_SPHERE: +0.50
OD_CYLINDER: -1.25
OS_CYLINDER: -1.25
OD_AXIS: 097
OS_AXIS: 083
OD_SPHERE: +0.75

## 2024-10-04 ASSESSMENT — LID EXAM ASSESSMENTS
OS_TRICHIASIS: LLL T
OD_BLEPHARITIS: RLL 1+
OS_BLEPHARITIS: LLL 1+

## 2024-10-04 ASSESSMENT — CONFRONTATIONAL VISUAL FIELD TEST (CVF)
OS_FINDINGS: FULL
OD_FINDINGS: FULL

## 2024-10-04 ASSESSMENT — KERATOMETRY
OS_K1POWER_DIOPTERS: 41.75
OS_K2POWER_DIOPTERS: 42.50
OD_K2POWER_DIOPTERS: 42.50
METHOD_AUTO_MANUAL: AUTO
OD_AXISANGLE_DEGREES: 007
OS_AXISANGLE_DEGREES: 169
OD_K1POWER_DIOPTERS: 42.00

## 2024-10-04 ASSESSMENT — VISUAL ACUITY
OD_BCVA: 20/25
OS_BCVA: 20/30+1

## 2024-10-04 ASSESSMENT — SUPERFICIAL PUNCTATE KERATITIS (SPK)
OS_SPK: 1+
OD_SPK: 1+

## 2024-10-04 ASSESSMENT — CORNEAL PTERYGIUM: OD_PTERYGIUM: NASAL 1MM

## 2024-10-04 ASSESSMENT — TONOMETRY
OS_IOP_MMHG: 15
OD_IOP_MMHG: 15

## 2025-04-04 ENCOUNTER — OFFICE (OUTPATIENT)
Dept: URBAN - METROPOLITAN AREA CLINIC 94 | Facility: CLINIC | Age: 65
Setting detail: OPHTHALMOLOGY
End: 2025-04-04
Payer: COMMERCIAL

## 2025-04-04 DIAGNOSIS — H35.033: ICD-10-CM

## 2025-04-04 DIAGNOSIS — H04.123: ICD-10-CM

## 2025-04-04 DIAGNOSIS — Z96.1: ICD-10-CM

## 2025-04-04 PROCEDURE — 92014 COMPRE OPH EXAM EST PT 1/>: CPT | Performed by: OPHTHALMOLOGY

## 2025-04-04 PROCEDURE — 92250 FUNDUS PHOTOGRAPHY W/I&R: CPT | Performed by: OPHTHALMOLOGY

## 2025-04-04 ASSESSMENT — REFRACTION_AUTOREFRACTION
OD_SPHERE: +0.25
OD_CYLINDER: -0.75
OS_SPHERE: +0.25
OS_AXIS: 082
OS_CYLINDER: -1.00
OD_AXIS: 093

## 2025-04-04 ASSESSMENT — REFRACTION_MANIFEST
OD_VA1: 20/25
OS_AXIS: 094
OS_SPHERE: PLANO
OS_VA1: 20/20
OD_AXIS: 095
OD_VA1: 20/25
OS_CYLINDER: -0.75
OD_AXIS: 086
OS_VA1: 20/25
OD_CYLINDER: -0.75
OS_ADD: +1.50
OD_CYLINDER: -0.75
OD_AXIS: 080
OD_ADD: +1.25
OS_SPHERE: +3.25
OD_ADD: +1.50
OD_VA1: 20/20
OD_CYLINDER: -1.00
OS_SPHERE: 0.00
OD_SPHERE: +0.25
OS_ADD: +1.00
OS_CYLINDER: -0.75
OS_CYLINDER: -0.75
OS_AXIS: 090
OS_VA1: 20/20
OD_SPHERE: +3.00
OS_AXIS: 086
OD_SPHERE: PLANO
OD_VA1: 20/20
OS_VA1: 20/20
OD_SPHERE: +2.25
OS_SPHERE: +2.50

## 2025-04-04 ASSESSMENT — VISUAL ACUITY
OD_BCVA: 20/25-1
OS_BCVA: 20/40

## 2025-04-04 ASSESSMENT — CORNEAL PTERYGIUM: OD_PTERYGIUM: NASAL 1MM

## 2025-04-04 ASSESSMENT — REFRACTION_CURRENTRX
OD_ADD: +2.00
OD_VPRISM_DIRECTION: PROGS
OD_SPHERE: +2.75
OS_VPRISM_DIRECTION: PROGS
OD_CYLINDER: -0.25
OS_ADD: +2.00
OD_OVR_VA: 20/
OD_SPHERE: +2.50
OD_CYLINDER: -0.25
OS_SPHERE: +2.75
OD_ADD: +1.75
OS_CYLINDER: -0.50
OS_OVR_VA: 20/
OS_SPHERE: +2.75
OD_OVR_VA: 20/
OS_AXIS: 107
OS_VPRISM_DIRECTION: PROGS
OD_AXIS: 037
OS_AXIS: 110
OS_ADD: +1.75
OD_AXIS: 087
OS_CYLINDER: -0.50
OS_OVR_VA: 20/
OD_VPRISM_DIRECTION: PROGS

## 2025-04-04 ASSESSMENT — KERATOMETRY
OD_AXISANGLE_DEGREES: 090
METHOD_AUTO_MANUAL: AUTO
OD_K1POWER_DIOPTERS: 42.25
OS_K1POWER_DIOPTERS: 42.00
OS_AXISANGLE_DEGREES: 149
OD_K2POWER_DIOPTERS: 42.25
OS_K2POWER_DIOPTERS: 42.25

## 2025-04-04 ASSESSMENT — CONFRONTATIONAL VISUAL FIELD TEST (CVF)
OD_FINDINGS: FULL
OS_FINDINGS: FULL

## 2025-04-04 ASSESSMENT — TONOMETRY
OD_IOP_MMHG: 17
OS_IOP_MMHG: 17

## 2025-04-04 ASSESSMENT — LID EXAM ASSESSMENTS
OS_TRICHIASIS: LLL T
OS_BLEPHARITIS: LLL 1+
OD_BLEPHARITIS: RLL 1+

## 2025-04-04 ASSESSMENT — SUPERFICIAL PUNCTATE KERATITIS (SPK)
OS_SPK: 1+
OD_SPK: 1+

## (undated) DEVICE — STERIS DEFENDO 3-PIECE KIT (AIR/WATER, SUCTION & BIOPSY VALVES)

## (undated) DEVICE — FORCEP RADIAL JAW 4 240CM DISP

## (undated) DEVICE — FORCEP ENDOJAW AGTR LC W NDL 2.8MM 230CM DISP